# Patient Record
Sex: MALE | Race: WHITE | NOT HISPANIC OR LATINO
[De-identification: names, ages, dates, MRNs, and addresses within clinical notes are randomized per-mention and may not be internally consistent; named-entity substitution may affect disease eponyms.]

---

## 2020-01-18 ENCOUNTER — TRANSCRIPTION ENCOUNTER (OUTPATIENT)
Age: 35
End: 2020-01-18

## 2020-12-01 ENCOUNTER — OUTPATIENT (OUTPATIENT)
Dept: OUTPATIENT SERVICES | Facility: HOSPITAL | Age: 35
LOS: 1 days | End: 2020-12-01
Payer: COMMERCIAL

## 2020-12-01 DIAGNOSIS — Z11.59 ENCOUNTER FOR SCREENING FOR OTHER VIRAL DISEASES: ICD-10-CM

## 2020-12-01 LAB — SARS-COV-2 RNA SPEC QL NAA+PROBE: DETECTED

## 2020-12-01 PROCEDURE — U0003: CPT

## 2020-12-02 DIAGNOSIS — Z11.59 ENCOUNTER FOR SCREENING FOR OTHER VIRAL DISEASES: ICD-10-CM

## 2021-01-20 ENCOUNTER — APPOINTMENT (OUTPATIENT)
Dept: ENDOCRINOLOGY | Facility: CLINIC | Age: 36
End: 2021-01-20
Payer: COMMERCIAL

## 2021-01-20 VITALS
BODY MASS INDEX: 25.03 KG/M2 | DIASTOLIC BLOOD PRESSURE: 82 MMHG | WEIGHT: 195 LBS | SYSTOLIC BLOOD PRESSURE: 116 MMHG | OXYGEN SATURATION: 99 % | HEIGHT: 74 IN | HEART RATE: 72 BPM | TEMPERATURE: 98 F

## 2021-01-20 DIAGNOSIS — Z81.8 FAMILY HISTORY OF OTHER MENTAL AND BEHAVIORAL DISORDERS: ICD-10-CM

## 2021-01-20 DIAGNOSIS — Z80.42 FAMILY HISTORY OF MALIGNANT NEOPLASM OF PROSTATE: ICD-10-CM

## 2021-01-20 DIAGNOSIS — Z80.8 FAMILY HISTORY OF MALIGNANT NEOPLASM OF OTHER ORGANS OR SYSTEMS: ICD-10-CM

## 2021-01-20 DIAGNOSIS — Z83.49 FAMILY HISTORY OF OTHER ENDOCRINE, NUTRITIONAL AND METABOLIC DISEASES: ICD-10-CM

## 2021-01-20 DIAGNOSIS — Z82.0 FAMILY HISTORY OF EPILEPSY AND OTHER DISEASES OF THE NERVOUS SYSTEM: ICD-10-CM

## 2021-01-20 DIAGNOSIS — Z80.52 FAMILY HISTORY OF MALIGNANT NEOPLASM OF BLADDER: ICD-10-CM

## 2021-01-20 PROCEDURE — 99072 ADDL SUPL MATRL&STAF TM PHE: CPT

## 2021-01-20 PROCEDURE — 99204 OFFICE O/P NEW MOD 45 MIN: CPT

## 2021-01-20 NOTE — HISTORY OF PRESENT ILLNESS
[FreeTextEntry1] : 35 year old male with history of thyroid nodule here for evaluation \par Referred by Dr. Murray \par \par Recently he met with his PCP and found to have an enlarged thyroid on palpation. Subsequently had an ultrasound and was found to have a right sided nodule. In office ultrasound showing a  1.39 x 0.74 x 1.29 cm with spongiform appearance on the right lower pole with Grade II vascularity.\par \par TFTs are within normal limits\par No family history of thyroid cancer\par No head or neck radiation exposure

## 2021-01-20 NOTE — REVIEW OF SYSTEMS
[Fatigue] : no fatigue [Decreased Appetite] : appetite not decreased [Recent Weight Gain (___ Lbs)] : no recent weight gain [Recent Weight Loss (___ Lbs)] : no recent weight loss [Visual Field Defect] : no visual field defect [Dry Eyes] : no dryness [Dysphagia] : no dysphagia [Neck Pain] : no neck pain [Dysphonia] : no dysphonia [Chest Pain] : no chest pain [Nasal Congestion] : no nasal congestion [Slow Heart Rate] : heart rate is not slow [Palpitations] : no palpitations [Fast Heart Rate] : heart rate is not fast [Shortness Of Breath] : no shortness of breath [Cough] : no cough [Nausea] : no nausea [Constipation] : no constipation [Vomiting] : no vomiting [Diarrhea] : no diarrhea [Polyuria] : no polyuria [Hesistancy] : no hesitancy [Joint Pain] : no joint pain [Muscle Weakness] : no muscle weakness [Acanthosis] : no acanthosis  [Headaches] : no headaches [Dizziness] : no dizziness [Tremors] : no tremors [Pain/Numbness of Digits] : no pain/numbness of digits [Depression] : no depression [Polydipsia] : no polydipsia [Cold Intolerance] : no cold intolerance [Easy Bruising] : no tendency for easy bruising [Easy Bleeding] : no ~M tendency for easy bleeding

## 2021-01-20 NOTE — ASSESSMENT
[FreeTextEntry1] : 35 year old male here for evaluation of right sided thyroid nodule. In office examination showing a very low suspicion nodule 1.39 cm spongiform appearance in the right lower pole. Currently not meeting FNA criteria.\par TFTs are within normal limits \par Will continue with observation.\par \par -Follow up with ultrasound in 6 months

## 2021-01-20 NOTE — PHYSICAL EXAM
[Alert] : alert [Well Nourished] : well nourished [No Acute Distress] : no acute distress [Normal Sclera/Conjunctiva] : normal sclera/conjunctiva [EOMI] : extra ocular movement intact [PERRL] : pupils equal, round and reactive to light [Normal Outer Ear/Nose] : the ears and nose were normal in appearance [Normal Hearing] : hearing was normal [Normal TMs] : both tympanic membranes were normal [No Respiratory Distress] : no respiratory distress [Clear to Auscultation] : lungs were clear to auscultation bilaterally [Normal to Percussion] : lungs were normal to percussion [Normal S1, S2] : normal S1 and S2 [Normal Rate] : heart rate was normal [Regular Rhythm] : with a regular rhythm [Normal Bowel Sounds] : normal bowel sounds [Not Tender] : non-tender [Soft] : abdomen soft [Normal Gait] : normal gait [No Clubbing, Cyanosis] : no clubbing  or cyanosis of the fingernails [No Joint Swelling] : no joint swelling seen [Normal Strength/Tone] : muscle strength and tone were normal [No Rash] : no rash [No Skin Lesions] : no skin lesions [No Motor Deficits] : the motor exam was normal [Normal Reflexes] : deep tendon reflexes were 2+ and symmetric [No Tremors] : no tremors [Oriented x3] : oriented to person, place, and time [Normal Affect] : the affect was normal [Normal Insight/Judgement] : insight and judgment were intact [Normal Mood] : the mood was normal [de-identified] : Thyroid enlarged +

## 2021-01-20 NOTE — CONSULT LETTER
[Dear  ___] : Dear  [unfilled], [Consult Letter:] : I had the pleasure of evaluating your patient, [unfilled]. [Please see my note below.] : Please see my note below. [Consult Closing:] : Thank you very much for allowing me to participate in the care of this patient.  If you have any questions, please do not hesitate to contact me. [Sincerely,] : Sincerely, [FreeTextEntry3] : April Saavedra DO

## 2021-07-15 ENCOUNTER — APPOINTMENT (OUTPATIENT)
Dept: ENDOCRINOLOGY | Facility: CLINIC | Age: 36
End: 2021-07-15
Payer: COMMERCIAL

## 2021-07-15 VITALS
SYSTOLIC BLOOD PRESSURE: 118 MMHG | WEIGHT: 210 LBS | OXYGEN SATURATION: 97 % | HEIGHT: 74 IN | DIASTOLIC BLOOD PRESSURE: 72 MMHG | BODY MASS INDEX: 26.95 KG/M2 | HEART RATE: 68 BPM | TEMPERATURE: 98.2 F

## 2021-07-15 PROCEDURE — 99213 OFFICE O/P EST LOW 20 MIN: CPT | Mod: 25

## 2021-07-15 PROCEDURE — 99072 ADDL SUPL MATRL&STAF TM PHE: CPT

## 2021-07-15 PROCEDURE — 76536 US EXAM OF HEAD AND NECK: CPT

## 2021-07-15 NOTE — PROCEDURE
[[employer] e 2008 model, 10-12 MHz frequencies] : multiple real time longitudinal and transverse images were obtained using a high resolution ultrasound with a linear transducer, [employer] e 2008 model, 10-12 MHz frequencies. All measurements will be reported as longitudinal x arnaldo-posterior x transverse. [] : a homogeneous parenchyma [Right Thyroid] : right [Lower] : lower pole there is a  [Heterogeneous] : heterogenous nodule [Spongiform Appearance] : spongiform appearance [Ovoid] : ovoid in shape [Smooth] : smooth [Thin] : has a thin halo [No calcification] : no calcification [Peripheral vascularity] : peripheral vascularity [2] : 2 [No abnormal lymph nodes are seen.] : no abnormal lymph nodes are seen [FreeTextEntry1] : 3.83 x 1.64 x 2.43 [FreeTextEntry5] : 3.81 x 1.4 x 2.05 [FreeTextEntry2] : 0.2 [FreeTextEntry3] : 1.26 x 0.86 x 1.15

## 2021-07-15 NOTE — HISTORY OF PRESENT ILLNESS
[FreeTextEntry1] : 36 year old male with history of thyroid nodule here for evaluation \par Referred by Dr. Murray \par \par Recently he met with his PCP and found to have an enlarged thyroid on palpation. Subsequently had an ultrasound and was found to have a right sided nodule. In office ultrasound showing a 1.39 x 0.74 x 1.29 cm with spongiform appearance on the right lower pole with Grade II vascularity.\par \par TFTs are within normal limits\par No family history of thyroid cancer\par No head or neck radiation exposure \par

## 2021-07-15 NOTE — ASSESSMENT
[FreeTextEntry1] : 36 year old male here for evaluation of right sided thyroid nodule.\par  In office examination showing a very low suspicion nodule 1.26 cm spongiform appearance in the right lower pole. Currently not meeting FNA criteria.\par TFTs are within normal limits \par Will continue with observation.\par \par -Follow up with ultrasound in 6 months. \par \par

## 2021-07-15 NOTE — IMPRESSION
[FreeTextEntry1] : Right lower pole nodule 1.26 cm, spongiform ( Displaying interval stability)  [FreeTextEntry2] : Follow up on ultrasound in 6 months

## 2021-07-15 NOTE — PROCEDURE
[[employer] e 2008 model, 10-12 MHz frequencies] : multiple real time longitudinal and transverse images were obtained using a high resolution ultrasound with a linear transducer, [employer] e 2008 model, 10-12 MHz frequencies. All measurements will be reported as longitudinal x arnaldo-posterior x transverse. [] : a homogeneous parenchyma [Right Thyroid] : right [Lower] : lower pole there is a  [Heterogeneous] : heterogenous nodule [Spongiform Appearance] : spongiform appearance [Ovoid] : ovoid in shape [Smooth] : smooth [Thin] : has a thin halo [No calcification] : no calcification [Peripheral vascularity] : peripheral vascularity [2] : 2 [No abnormal lymph nodes are seen.] : no abnormal lymph nodes are seen [FreeTextEntry1] : 3.83 x 1.64 x 2.43 [FreeTextEntry5] : 3.81 x 1.4 x 2.05 [FreeTextEntry2] : 0.2 [FreeTextEntry3] : 1.26 x 0.86 x 1.15

## 2022-02-03 ENCOUNTER — APPOINTMENT (OUTPATIENT)
Dept: ENDOCRINOLOGY | Facility: CLINIC | Age: 37
End: 2022-02-03
Payer: COMMERCIAL

## 2022-02-03 VITALS
SYSTOLIC BLOOD PRESSURE: 124 MMHG | WEIGHT: 215 LBS | HEART RATE: 89 BPM | BODY MASS INDEX: 27.6 KG/M2 | DIASTOLIC BLOOD PRESSURE: 80 MMHG | TEMPERATURE: 97.3 F | OXYGEN SATURATION: 98 %

## 2022-02-03 DIAGNOSIS — Z00.00 ENCOUNTER FOR GENERAL ADULT MEDICAL EXAMINATION W/OUT ABNORMAL FINDINGS: ICD-10-CM

## 2022-02-03 PROCEDURE — 76536 US EXAM OF HEAD AND NECK: CPT

## 2022-02-03 PROCEDURE — 99213 OFFICE O/P EST LOW 20 MIN: CPT | Mod: 25

## 2022-02-03 NOTE — ASSESSMENT
[FreeTextEntry1] : 37 year old male here for f/u of right sided thyroid nodule.\par  In office examination showing a very low suspicion nodule 1.28 cm spongiform appearance in the right lower pole. Currently not meeting FNA criteria.\par TFTs are within normal limits \par Will continue with observation.\par \par HCM\par -Check CBC w/diff, CMP, HgA1c, lipid panel, Vitamin D 25 OH\par \par -Follow up with ultrasound in 6 months. \par

## 2022-02-03 NOTE — IMPRESSION
[FreeTextEntry1] : Left lower pole nodule 1.28 cm spongiform appearance, displaying interval stability  [FreeTextEntry2] : Follow up ultrasound in 6 months -1 year

## 2022-02-03 NOTE — PROCEDURE
[[employer] 2008 model, 10-12 MHz frequencies] : multiple real time longitudinal and transverse images were obtained using a high resolution ultrasound with a linear transducer, [employer] e 2008 model, 10-12 MHz frequencies. All measurements will be reported as longitudinal x arnaldo-posterior x transverse. [] : a homogeneous parenchyma [Right Thyroid] : right [Lower] : lower pole there is a  [Spongiform Appearance] : spongiform appearance [Ovoid] : ovoid in shape [Indistinct] : indistinct [No] : does not have a halo [No calcification] : no calcification [Peripheral vascularity] : peripheral vascularity [2] : 2 [No abnormal lymph nodes are seen.] : no abnormal lymph nodes are seen [FreeTextEntry1] : 3.86 x 1.68 x 2.71 [FreeTextEntry5] : 3.8 x 1.34 x 1.85 [FreeTextEntry2] : 0.36 [FreeTextEntry3] : 1.28 x 0.85 x 1.22

## 2022-02-03 NOTE — HISTORY OF PRESENT ILLNESS
[FreeTextEntry1] : 36 year old male with history of thyroid nodule here for f/u\par Referred by Dr. Murray \par \par Recently he met with his PCP and found to have an enlarged thyroid on palpation. Subsequently had an ultrasound and was found to have a right sided nodule. In office ultrasound showing a 1.39 x 0.74 x 1.29 cm with spongiform appearance on the right lower pole with Grade II vascularity.\par \par TFTs are within normal limits\par No family history of thyroid cancer\par No head or neck radiation exposure \par \par

## 2022-02-03 NOTE — PROCEDURE
[[employer] e 2008 model, 10-12 MHz frequencies] : multiple real time longitudinal and transverse images were obtained using a high resolution ultrasound with a linear transducer, [employer] e 2008 model, 10-12 MHz frequencies. All measurements will be reported as longitudinal x arnaldo-posterior x transverse. [] : a homogeneous parenchyma [Right Thyroid] : right [Lower] : lower pole there is a  [Spongiform Appearance] : spongiform appearance [Ovoid] : ovoid in shape [Indistinct] : indistinct [No] : does not have a halo [No calcification] : no calcification [Peripheral vascularity] : peripheral vascularity [2] : 2 [No abnormal lymph nodes are seen.] : no abnormal lymph nodes are seen [FreeTextEntry1] : 3.86 x 1.68 x 2.71 [FreeTextEntry5] : 3.8 x 1.34 x 1.85 [FreeTextEntry2] : 0.36 [FreeTextEntry3] : 1.28 x 0.85 x 1.22

## 2022-02-04 LAB
25(OH)D3 SERPL-MCNC: 75.4 NG/ML
ALBUMIN SERPL ELPH-MCNC: 4.9 G/DL
ALP BLD-CCNC: 66 U/L
ALT SERPL-CCNC: 17 U/L
ANION GAP SERPL CALC-SCNC: 12 MMOL/L
AST SERPL-CCNC: 12 U/L
BASOPHILS # BLD AUTO: 0.03 K/UL
BASOPHILS NFR BLD AUTO: 0.5 %
BILIRUB SERPL-MCNC: 0.3 MG/DL
BUN SERPL-MCNC: 15 MG/DL
CALCIUM SERPL-MCNC: 10.3 MG/DL
CHLORIDE SERPL-SCNC: 99 MMOL/L
CHOLEST SERPL-MCNC: 182 MG/DL
CO2 SERPL-SCNC: 31 MMOL/L
COVID-19 NUCLEOCAPSID  GAM ANTIBODY INTERPRETATION: POSITIVE
CREAT SERPL-MCNC: 1.13 MG/DL
EOSINOPHIL # BLD AUTO: 0.06 K/UL
EOSINOPHIL NFR BLD AUTO: 1 %
ESTIMATED AVERAGE GLUCOSE: 94 MG/DL
GLUCOSE SERPL-MCNC: 96 MG/DL
HBA1C MFR BLD HPLC: 4.9 %
HCT VFR BLD CALC: 46.9 %
HDLC SERPL-MCNC: 45 MG/DL
HGB BLD-MCNC: 15.6 G/DL
IMM GRANULOCYTES NFR BLD AUTO: 0.3 %
LDLC SERPL CALC-MCNC: 108 MG/DL
LYMPHOCYTES # BLD AUTO: 1.75 K/UL
LYMPHOCYTES NFR BLD AUTO: 30.1 %
MAN DIFF?: NORMAL
MCHC RBC-ENTMCNC: 30.7 PG
MCHC RBC-ENTMCNC: 33.3 GM/DL
MCV RBC AUTO: 92.3 FL
MONOCYTES # BLD AUTO: 0.29 K/UL
MONOCYTES NFR BLD AUTO: 5 %
NEUTROPHILS # BLD AUTO: 3.66 K/UL
NEUTROPHILS NFR BLD AUTO: 63.1 %
NONHDLC SERPL-MCNC: 137 MG/DL
PLATELET # BLD AUTO: 251 K/UL
POTASSIUM SERPL-SCNC: 5.3 MMOL/L
PROT SERPL-MCNC: 7 G/DL
RBC # BLD: 5.08 M/UL
RBC # FLD: 11.6 %
SARS-COV-2 AB SERPL QL IA: 53.7 INDEX
SODIUM SERPL-SCNC: 142 MMOL/L
T4 FREE SERPL-MCNC: 1.2 NG/DL
TRIGL SERPL-MCNC: 147 MG/DL
TSH SERPL-ACNC: 1.27 UIU/ML
WBC # FLD AUTO: 5.81 K/UL

## 2022-08-03 ENCOUNTER — APPOINTMENT (OUTPATIENT)
Dept: ENDOCRINOLOGY | Facility: CLINIC | Age: 37
End: 2022-08-03

## 2022-09-01 ENCOUNTER — APPOINTMENT (OUTPATIENT)
Dept: ENDOCRINOLOGY | Facility: CLINIC | Age: 37
End: 2022-09-01

## 2022-09-01 VITALS
SYSTOLIC BLOOD PRESSURE: 110 MMHG | DIASTOLIC BLOOD PRESSURE: 80 MMHG | OXYGEN SATURATION: 98 % | HEIGHT: 74 IN | BODY MASS INDEX: 28.23 KG/M2 | TEMPERATURE: 96.9 F | HEART RATE: 93 BPM | WEIGHT: 220 LBS

## 2022-09-01 DIAGNOSIS — E04.1 NONTOXIC SINGLE THYROID NODULE: ICD-10-CM

## 2022-09-01 PROCEDURE — 99213 OFFICE O/P EST LOW 20 MIN: CPT | Mod: 25

## 2022-09-01 PROCEDURE — 76536 US EXAM OF HEAD AND NECK: CPT

## 2022-09-01 NOTE — PROCEDURE
[[employer] e 2008 model, 10-12 MHz frequencies] : multiple real time longitudinal and transverse images were obtained using a high resolution ultrasound with a linear transducer, [employer] e 2008 model, 10-12 MHz frequencies. All measurements will be reported as longitudinal x arnaldo-posterior x transverse. [] : a homogeneous parenchyma [Right Thyroid] : right [Lower] : lower pole there is a  [Spongiform Appearance] : spongiform appearance [Ovoid] : ovoid in shape [Indistinct] : indistinct [No] : does not have a halo [No calcification] : no calcification [Peripheral vascularity] : peripheral vascularity [2] : 2 [No abnormal lymph nodes are seen.] : no abnormal lymph nodes are seen [FreeTextEntry1] : 4.61 x 1.65 x 2.59 [FreeTextEntry5] : 3.84 x 1.36 x 2.06 [FreeTextEntry2] : 0.34 [FreeTextEntry3] : 1.26 x 0.73 x 1.27

## 2022-09-01 NOTE — ASSESSMENT
[FreeTextEntry1] : 37 year old male here for f/u of right sided thyroid nodule.\par  In office examination showing a very low suspicion nodule 1.28 cm spongiform appearance in the right lower pole. Currently not meeting FNA criteria.\par TFTs are within normal limits \par Will continue with observation.\par \par \par -Follow up with ultrasound in 1 year\par

## 2022-09-01 NOTE — IMPRESSION
[FreeTextEntry1] : Right lower pole nodule displaying interval stability  [FreeTextEntry2] : Follow up in one year

## 2022-09-01 NOTE — REVIEW OF SYSTEMS
[Fatigue] : no fatigue [Decreased Appetite] : appetite not decreased [Recent Weight Gain (___ Lbs)] : no recent weight gain [Recent Weight Loss (___ Lbs)] : no recent weight loss [Visual Field Defect] : no visual field defect [Dry Eyes] : no dryness [Dysphagia] : no dysphagia [Neck Pain] : no neck pain [Dysphonia] : no dysphonia [Nasal Congestion] : no nasal congestion [Chest Pain] : no chest pain [Slow Heart Rate] : heart rate is not slow [Palpitations] : no palpitations [Fast Heart Rate] : heart rate is not fast [Shortness Of Breath] : no shortness of breath [Cough] : no cough [Nausea] : no nausea [Constipation] : no constipation [Vomiting] : no vomiting [Diarrhea] : no diarrhea [Polyuria] : no polyuria [Hesistancy] : no hesitancy [Joint Pain] : no joint pain [Acanthosis] : no acanthosis  [Acne] : no acne [Headaches] : no headaches [Dizziness] : no dizziness [Tremors] : no tremors [Pain/Numbness of Digits] : no pain/numbness of digits [Depression] : no depression [Polydipsia] : no polydipsia [Cold Intolerance] : no cold intolerance [Easy Bleeding] : no ~M tendency for easy bleeding [Easy Bruising] : no tendency for easy bruising

## 2022-09-01 NOTE — PROCEDURE
[[email or web address] e 2008 model, 10-12 MHz frequencies] : multiple real time longitudinal and transverse images were obtained using a high resolution ultrasound with a linear transducer, [email or web address] e 2008 model, 10-12 MHz frequencies. All measurements will be reported as longitudinal x arnaldo-posterior x transverse. [] : a homogeneous parenchyma [Right Thyroid] : right [Lower] : lower pole there is a  [Spongiform Appearance] : spongiform appearance [Ovoid] : ovoid in shape [Indistinct] : indistinct [No] : does not have a halo [No calcification] : no calcification [Peripheral vascularity] : peripheral vascularity [2] : 2 [No abnormal lymph nodes are seen.] : no abnormal lymph nodes are seen [FreeTextEntry1] : 4.61 x 1.65 x 2.59 [FreeTextEntry5] : 3.84 x 1.36 x 2.06 [FreeTextEntry2] : 0.34 [FreeTextEntry3] : 1.26 x 0.73 x 1.27

## 2022-10-08 ENCOUNTER — NON-APPOINTMENT (OUTPATIENT)
Age: 37
End: 2022-10-08

## 2022-10-12 ENCOUNTER — APPOINTMENT (OUTPATIENT)
Dept: ENDOCRINOLOGY | Facility: CLINIC | Age: 37
End: 2022-10-12

## 2023-02-14 ENCOUNTER — OFFICE (OUTPATIENT)
Dept: URBAN - METROPOLITAN AREA CLINIC 27 | Facility: CLINIC | Age: 38
Setting detail: OPHTHALMOLOGY
End: 2023-02-14
Payer: COMMERCIAL

## 2023-02-14 DIAGNOSIS — H16.423: ICD-10-CM

## 2023-02-14 DIAGNOSIS — H11.32: ICD-10-CM

## 2023-02-14 PROCEDURE — 92012 INTRM OPH EXAM EST PATIENT: CPT | Performed by: OPHTHALMOLOGY

## 2023-02-14 ASSESSMENT — KERATOMETRY
OD_AXISANGLE_DEGREES: 074
OD_K1POWER_DIOPTERS: 43.25
OD_K2POWER_DIOPTERS: 44.00
OS_K1POWER_DIOPTERS: 43.75
OS_AXISANGLE_DEGREES: 109
OS_K2POWER_DIOPTERS: 44.25

## 2023-02-14 ASSESSMENT — REFRACTION_CURRENTRX
OS_AXIS: 085
OD_VPRISM_DIRECTION: SV
OS_VPRISM_DIRECTION: SV
OD_SPHERE: -1.50
OD_CYLINDER: +0.50
OS_CYLINDER: +0.50
OS_OVR_VA: 20/
OD_OVR_VA: 20/
OD_SPHERE: -2.00
OD_OVR_VA: 20/
OS_SPHERE: -2.00
OS_OVR_VA: 20/
OD_AXIS: 065
OS_SPHERE: -2.50

## 2023-02-14 ASSESSMENT — AXIALLENGTH_DERIVED
OS_AL: 24.3576
OD_AL: 24.2454
OD_AL: 24.2454
OS_AL: 24.3058

## 2023-02-14 ASSESSMENT — REFRACTION_MANIFEST
OD_VA1: 20/20
OS_AXIS: 085
OD_SPHERE: -2.00
OS_SPHERE: -2.50
OS_VA1: 20/20
OS_CYLINDER: +0.50
OD_AXIS: 065
OD_CYLINDER: +0.50

## 2023-02-14 ASSESSMENT — VASCULARIZATION
OD_VASCULARIZATION: PANNUS PERIPHERAL SUPERFICIAL
OS_VASCULARIZATION: PANNUS PERIPHERAL SUPERFICIAL

## 2023-02-14 ASSESSMENT — REFRACTION_AUTOREFRACTION
OS_CYLINDER: +0.25
OD_CYLINDER: +0.50
OD_AXIS: 072
OD_SPHERE: -2.00
OS_SPHERE: -2.50
OS_AXIS: 123

## 2023-02-14 ASSESSMENT — SPHEQUIV_DERIVED
OD_SPHEQUIV: -1.75
OS_SPHEQUIV: -2.375
OS_SPHEQUIV: -2.25
OD_SPHEQUIV: -1.75

## 2023-02-14 ASSESSMENT — VISUAL ACUITY
OS_BCVA: 20/20
OD_BCVA: 20/20

## 2023-02-14 ASSESSMENT — TONOMETRY
OS_IOP_MMHG: 16
OS_IOP_MMHG: 12
OD_IOP_MMHG: 11

## 2023-05-26 ENCOUNTER — OFFICE (OUTPATIENT)
Dept: URBAN - METROPOLITAN AREA CLINIC 27 | Facility: CLINIC | Age: 38
Setting detail: OPHTHALMOLOGY
End: 2023-05-26
Payer: COMMERCIAL

## 2023-05-26 DIAGNOSIS — H52.13: ICD-10-CM

## 2023-05-26 DIAGNOSIS — H16.423: ICD-10-CM

## 2023-05-26 DIAGNOSIS — D31.31: ICD-10-CM

## 2023-05-26 PROCEDURE — 92015 DETERMINE REFRACTIVE STATE: CPT | Performed by: OPHTHALMOLOGY

## 2023-05-26 PROCEDURE — 92014 COMPRE OPH EXAM EST PT 1/>: CPT | Performed by: OPHTHALMOLOGY

## 2023-05-26 ASSESSMENT — REFRACTION_MANIFEST
OD_AXIS: 065
OS_AXIS: 085
OS_SPHERE: -2.50
OS_CYLINDER: +0.50
OD_CYLINDER: +0.50
OD_VA1: 20/20
OD_VA1: 20/20
OS_CYLINDER: +0.50
OS_VA1: 20/20
OS_SPHERE: -2.50
OD_SPHERE: -2.00
OS_AXIS: 105
OD_SPHERE: -1.75
OD_AXIS: 085
OS_VA1: 20/20
OD_CYLINDER: +0.50

## 2023-05-26 ASSESSMENT — VASCULARIZATION
OS_VASCULARIZATION: PANNUS PERIPHERAL SUPERFICIAL
OD_VASCULARIZATION: PANNUS PERIPHERAL SUPERFICIAL

## 2023-05-26 ASSESSMENT — KERATOMETRY
OS_AXISANGLE_DEGREES: 112
OD_AXISANGLE_DEGREES: 81
OS_K2POWER_DIOPTERS: 44.50
OD_K2POWER_DIOPTERS: 44.00
OD_K1POWER_DIOPTERS: 43.00
METHOD_AUTO_MANUAL: AUTO
OS_K1POWER_DIOPTERS: 43.75

## 2023-05-26 ASSESSMENT — REFRACTION_AUTOREFRACTION
OD_CYLINDER: +0.50
OS_AXIS: 117
OD_SPHERE: -2.00
OD_AXIS: 76
OS_CYLINDER: +0.50
OS_SPHERE: -2.50

## 2023-05-26 ASSESSMENT — SPHEQUIV_DERIVED
OD_SPHEQUIV: -1.75
OS_SPHEQUIV: -2.25
OS_SPHEQUIV: -2.25
OD_SPHEQUIV: -1.5
OD_SPHEQUIV: -1.75
OS_SPHEQUIV: -2.25

## 2023-05-26 ASSESSMENT — TONOMETRY
OS_IOP_MMHG: 13
OD_IOP_MMHG: 10

## 2023-05-26 ASSESSMENT — REFRACTION_CURRENTRX
OD_OVR_VA: 20/
OD_CYLINDER: +0.50
OS_CYLINDER: +0.50
OD_VPRISM_DIRECTION: SV
OD_SPHERE: -2.25
OS_OVR_VA: 20/
OS_SPHERE: -2.00
OD_SPHERE: -1.50
OS_AXIS: 84
OS_OVR_VA: 20/
OS_SPHERE: -2.75
OD_AXIS: 068
OS_VPRISM_DIRECTION: SV
OD_OVR_VA: 20/

## 2023-05-26 ASSESSMENT — VISUAL ACUITY
OS_BCVA: 20/20
OD_BCVA: 20/20

## 2023-05-26 ASSESSMENT — AXIALLENGTH_DERIVED
OS_AL: 24.2572
OS_AL: 24.2572
OD_AL: 24.1912
OD_AL: 24.294
OD_AL: 24.294
OS_AL: 24.2572

## 2023-05-26 ASSESSMENT — CONFRONTATIONAL VISUAL FIELD TEST (CVF)
OD_FINDINGS: FULL
OS_FINDINGS: FULL

## 2023-06-07 ENCOUNTER — NON-APPOINTMENT (OUTPATIENT)
Age: 38
End: 2023-06-07